# Patient Record
Sex: FEMALE | Race: BLACK OR AFRICAN AMERICAN | NOT HISPANIC OR LATINO | Employment: FULL TIME | URBAN - METROPOLITAN AREA
[De-identification: names, ages, dates, MRNs, and addresses within clinical notes are randomized per-mention and may not be internally consistent; named-entity substitution may affect disease eponyms.]

---
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
## 2020-04-08 PROBLEM — K58.1 IRRITABLE BOWEL SYNDROME WITH CONSTIPATION: Status: ACTIVE | Noted: 2020-04-08

## 2020-06-26 PROBLEM — N20.0 RENAL LITHIASIS: Status: ACTIVE | Noted: 2020-06-26

## 2020-09-26 ENCOUNTER — HOSPITAL ENCOUNTER (EMERGENCY)
Facility: HOSPITAL | Age: 20
Discharge: HOME | End: 2020-09-26
Attending: EMERGENCY MEDICINE
Payer: MEDICAID

## 2020-09-26 VITALS
HEIGHT: 63 IN | BODY MASS INDEX: 30.65 KG/M2 | RESPIRATION RATE: 19 BRPM | WEIGHT: 173 LBS | TEMPERATURE: 99 F | DIASTOLIC BLOOD PRESSURE: 56 MMHG | HEART RATE: 92 BPM | OXYGEN SATURATION: 100 % | SYSTOLIC BLOOD PRESSURE: 117 MMHG

## 2020-09-26 DIAGNOSIS — K59.00 CONSTIPATION, UNSPECIFIED CONSTIPATION TYPE: ICD-10-CM

## 2020-09-26 DIAGNOSIS — R10.84 GENERALIZED ABDOMINAL PAIN: Primary | ICD-10-CM

## 2020-09-26 LAB
ALBUMIN SERPL BCP-MCNC: 3.7 G/DL (ref 3.5–5.2)
ALP SERPL-CCNC: 41 U/L (ref 55–135)
ALT SERPL W/O P-5'-P-CCNC: 11 U/L (ref 10–44)
ANION GAP SERPL CALC-SCNC: 8 MMOL/L (ref 8–16)
AST SERPL-CCNC: 14 U/L (ref 10–40)
B-HCG UR QL: NEGATIVE
BASOPHILS # BLD AUTO: 0.02 K/UL (ref 0–0.2)
BASOPHILS NFR BLD: 0.4 % (ref 0–1.9)
BILIRUB SERPL-MCNC: 0.2 MG/DL (ref 0.1–1)
BILIRUB UR QL STRIP: NEGATIVE
BUN SERPL-MCNC: 8 MG/DL (ref 6–20)
CALCIUM SERPL-MCNC: 9.2 MG/DL (ref 8.7–10.5)
CHLORIDE SERPL-SCNC: 105 MMOL/L (ref 95–110)
CLARITY UR: CLEAR
CO2 SERPL-SCNC: 25 MMOL/L (ref 23–29)
COLOR UR: YELLOW
CREAT SERPL-MCNC: 0.7 MG/DL (ref 0.5–1.4)
CTP QC/QA: YES
DIFFERENTIAL METHOD: ABNORMAL
EOSINOPHIL # BLD AUTO: 0.1 K/UL (ref 0–0.5)
EOSINOPHIL NFR BLD: 1.8 % (ref 0–8)
ERYTHROCYTE [DISTWIDTH] IN BLOOD BY AUTOMATED COUNT: 13.4 % (ref 11.5–14.5)
EST. GFR  (AFRICAN AMERICAN): >60 ML/MIN/1.73 M^2
EST. GFR  (NON AFRICAN AMERICAN): >60 ML/MIN/1.73 M^2
GLUCOSE SERPL-MCNC: 112 MG/DL (ref 70–110)
GLUCOSE UR QL STRIP: NEGATIVE
HCT VFR BLD AUTO: 39.5 % (ref 37–48.5)
HGB BLD-MCNC: 12.5 G/DL (ref 12–16)
HGB UR QL STRIP: ABNORMAL
IMM GRANULOCYTES # BLD AUTO: 0.01 K/UL (ref 0–0.04)
IMM GRANULOCYTES NFR BLD AUTO: 0.2 % (ref 0–0.5)
KETONES UR QL STRIP: NEGATIVE
LEUKOCYTE ESTERASE UR QL STRIP: NEGATIVE
LYMPHOCYTES # BLD AUTO: 2.6 K/UL (ref 1–4.8)
LYMPHOCYTES NFR BLD: 48.6 % (ref 18–48)
MCH RBC QN AUTO: 26.9 PG (ref 27–31)
MCHC RBC AUTO-ENTMCNC: 31.6 G/DL (ref 32–36)
MCV RBC AUTO: 85 FL (ref 82–98)
MICROSCOPIC COMMENT: NORMAL
MONOCYTES # BLD AUTO: 0.4 K/UL (ref 0.3–1)
MONOCYTES NFR BLD: 7.2 % (ref 4–15)
NEUTROPHILS # BLD AUTO: 2.3 K/UL (ref 1.8–7.7)
NEUTROPHILS NFR BLD: 41.8 % (ref 38–73)
NITRITE UR QL STRIP: NEGATIVE
NRBC BLD-RTO: 0 /100 WBC
PH UR STRIP: 6 [PH] (ref 5–8)
PLATELET # BLD AUTO: 329 K/UL (ref 150–350)
PMV BLD AUTO: 9.4 FL (ref 9.2–12.9)
POTASSIUM SERPL-SCNC: 3.7 MMOL/L (ref 3.5–5.1)
PROT SERPL-MCNC: 7.5 G/DL (ref 6–8.4)
PROT UR QL STRIP: NEGATIVE
RBC # BLD AUTO: 4.64 M/UL (ref 4–5.4)
RBC #/AREA URNS HPF: 3 /HPF (ref 0–4)
SODIUM SERPL-SCNC: 138 MMOL/L (ref 136–145)
SP GR UR STRIP: 1.02 (ref 1–1.03)
URN SPEC COLLECT METH UR: ABNORMAL
UROBILINOGEN UR STRIP-ACNC: NEGATIVE EU/DL
WBC # BLD AUTO: 5.41 K/UL (ref 3.9–12.7)
WBC #/AREA URNS HPF: 2 /HPF (ref 0–5)

## 2020-09-26 PROCEDURE — 99284 EMERGENCY DEPT VISIT MOD MDM: CPT | Mod: 25

## 2020-09-26 PROCEDURE — 81025 URINE PREGNANCY TEST: CPT | Performed by: EMERGENCY MEDICINE

## 2020-09-26 PROCEDURE — 85025 COMPLETE CBC W/AUTO DIFF WBC: CPT

## 2020-09-26 PROCEDURE — 81000 URINALYSIS NONAUTO W/SCOPE: CPT

## 2020-09-26 PROCEDURE — 80053 COMPREHEN METABOLIC PANEL: CPT

## 2020-09-26 PROCEDURE — 36000 PLACE NEEDLE IN VEIN: CPT

## 2020-09-26 RX ORDER — DICYCLOMINE HYDROCHLORIDE 10 MG/ML
20 INJECTION INTRAMUSCULAR
Status: DISCONTINUED | OUTPATIENT
Start: 2020-09-26 | End: 2020-09-26 | Stop reason: HOSPADM

## 2020-09-26 RX ORDER — DICYCLOMINE HYDROCHLORIDE 20 MG/1
20 TABLET ORAL 2 TIMES DAILY
Qty: 30 TABLET | Refills: 0 | Status: SHIPPED | OUTPATIENT
Start: 2020-09-26 | End: 2020-10-11

## 2020-09-26 NOTE — ED TRIAGE NOTES
Pt complaina of lower abd pain, x 2-3 days denies n/v/d tolerating po food and fluids, states has hx of kidney stones

## 2020-09-27 NOTE — ED PROVIDER NOTES
Encounter Date: 9/26/2020       History     Chief Complaint   Patient presents with    Abdominal Pain     Pt reports generalized abdominal pain and diarrhea that started yesterday.      Patient is a 19 y/o female w/ PMH of IBS who presents to ED with c/o generalized abdominal pain x 2 days. Patient describes the pains as sharp and intermittent. Denies any exacerbating or alleviating factors. Denies taking anything for the pain. She reports 1 episode of soft stool this morning. Denies any nausea, vomiting, dysuria, hematuria, decreased urine output, fever, chills, or any other complaints.         Review of patient's allergies indicates:  No Known Allergies  Past Medical History:   Diagnosis Date    Asthma     Helicobacter pylori antibody positive     IBS (irritable bowel syndrome)     STD (sexually transmitted disease)     VUR (vesicoureteric reflux)      Past Surgical History:   Procedure Laterality Date    MYRINGOTOMY W/ TUBES Bilateral      Family History   Problem Relation Age of Onset    No Known Problems Mother     Hypertension Father     Asthma Father      Social History     Tobacco Use    Smoking status: Never Smoker    Smokeless tobacco: Never Used   Substance Use Topics    Alcohol use: No     Alcohol/week: 0.0 standard drinks    Drug use: No     Review of Systems   Constitutional: Negative for chills, diaphoresis and fever.   Respiratory: Negative for shortness of breath.    Cardiovascular: Negative for chest pain.   Gastrointestinal: Positive for abdominal pain and diarrhea. Negative for abdominal distention, blood in stool, constipation, nausea and vomiting.   Genitourinary: Negative for decreased urine volume, difficulty urinating, dysuria, flank pain, frequency, hematuria, urgency, vaginal bleeding and vaginal discharge.   Musculoskeletal: Negative for back pain.   Skin: Negative for rash.   Neurological: Negative for weakness.       Physical Exam     Initial Vitals [09/26/20 1304]   BP  Pulse Resp Temp SpO2   (!) 138/90 97 19 99.1 °F (37.3 °C) 99 %      MAP       --         Physical Exam    Nursing note and vitals reviewed.  Constitutional: She appears well-developed and well-nourished. She is not diaphoretic. No distress.   Sitting upright in bed, smiling and playing on cellular device   HENT:   Head: Normocephalic and atraumatic.   Eyes: Conjunctivae and EOM are normal. Pupils are equal, round, and reactive to light.   Neck: Normal range of motion. Neck supple.   Cardiovascular: Normal rate, regular rhythm, normal heart sounds and intact distal pulses.   Pulmonary/Chest: Breath sounds normal. No respiratory distress.   Abdominal: Soft. Bowel sounds are normal. There is no abdominal tenderness.   Abdomen is soft, no focal abdominal tenderness. No rebound or guarding.    Musculoskeletal: Normal range of motion. No tenderness or edema.   Neurological: She is alert and oriented to person, place, and time. She has normal strength. GCS score is 15. GCS eye subscore is 4. GCS verbal subscore is 5. GCS motor subscore is 6.   Skin: Skin is warm. Capillary refill takes less than 2 seconds. No rash noted.         ED Course   Procedures  Labs Reviewed   CBC W/ AUTO DIFFERENTIAL - Abnormal; Notable for the following components:       Result Value    Mean Corpuscular Hemoglobin 26.9 (*)     Mean Corpuscular Hemoglobin Conc 31.6 (*)     Lymph% 48.6 (*)     All other components within normal limits   COMPREHENSIVE METABOLIC PANEL - Abnormal; Notable for the following components:    Glucose 112 (*)     Alkaline Phosphatase 41 (*)     All other components within normal limits   URINALYSIS, REFLEX TO URINE CULTURE - Abnormal; Notable for the following components:    Occult Blood UA 1+ (*)     All other components within normal limits    Narrative:     Specimen Source->Urine   URINALYSIS MICROSCOPIC    Narrative:     Specimen Source->Urine   POCT URINE PREGNANCY          Imaging Results          X-Ray Abdomen Flat  And Erect (Final result)  Result time 09/26/20 16:03:57    Final result by James Glaser MD (09/26/20 16:03:57)                 Impression:      Mild colonic stool burden which may represent constipation, without evidence of obstruction.      Electronically signed by: James Glaser MD  Date:    09/26/2020  Time:    16:03             Narrative:    EXAMINATION:  XR ABDOMEN FLAT AND ERECT    CLINICAL HISTORY:  Generalized abdominal pain    TECHNIQUE:  Flat and erect AP views of the abdomen were performed.    COMPARISON:  CT renal stone study 06/23/2020 and abdominal series 01/09/2017; abdominal ultrasound 07/25/2018    FINDINGS:  Mild amount of scattered fecal material throughout the colon.  Otherwise, no dilated loops of bowel or small bowel air-fluid levels to suggest obstruction.  No organomegaly or significant mass effect.  No large amount of free air or abnormal intra-abdominal calcification seen in this patient with known right kidney 2 mm nephrolith.  Imaged lung bases are clear.  Osseous structures are intact.                                 Medical Decision Making:   Initial Assessment:   Patient is a 19 y/o female who presents to ED with c/o generalized abdominal pain x 2 days. VSS, non-toxic appearing, patient playing on cellular device on initial exam. Benign abdominal examination - no tenderness, abdomen is soft with normal bowel sound. No rebound or guarding.   ED Management:  CBC is without leukocytosis or bandemia.   Electrolytes within normal limits. Normal renal function and normal LFTs.   Lipase normal.   UA w/o evidence of acute cystitis. UPT negative.   X-ray shows mild colonic stool burden which may represent constipation. No free air or concern for obstruction.   Patient is well-appearing and hemodynamically stable. Repeat abdominal examination w/o any tenderness. Patient will be discharged to home. Given rx to take as needed for the intermittent pains. Patient having bowel movements, not  regular. Discussed high fiber diet and increasing fluid intake. Also discussed with her to use MIralax prn OTC for soft daily bowel movements. Advised her to f/u with PCP in 1-2 days for re-evaluation. ED precautions discussed to return for any worsening of symptoms. Patient voiced understanding and agreement with plan of care.                    ED Course as of Sep 27 0425   Sat Sep 26, 2020   1700 I discussed the patient's care with Advanced Practice Clinician. I did not perform a face to face evaluation. I reviewed their note and agree with the history, physical, assessment, diagnosis, treatment, and discharge plan.    [NP]      ED Course User Index  [NP] Stalin García MD            Clinical Impression:       ICD-10-CM ICD-9-CM   1. Generalized abdominal pain  R10.84 789.07   2. Constipation, unspecified constipation type  K59.00 564.00                      Disposition:   Disposition: Discharged  Condition: Stable     ED Disposition Condition    Discharge Stable        ED Prescriptions     Medication Sig Dispense Start Date End Date Auth. Provider    dicyclomine (BENTYL) 20 mg tablet Take 1 tablet (20 mg total) by mouth 2 (two) times daily. for 15 days 30 tablet 9/26/2020 10/11/2020 Mona Crawley PA-C        Follow-up Information     Follow up With Specialties Details Why Contact Info    [facility] Schedule an appointment as soon as possible for a visit in 2 days For recheck of symptoms you were seen for today [address]  [place] [address]  [phone]      [facility] Go to  If symptoms worsen [address] [facility] [id number] [phone]                                       Mona Crawley PA-C  09/27/20 2158

## 2020-09-30 ENCOUNTER — HOSPITAL ENCOUNTER (EMERGENCY)
Facility: HOSPITAL | Age: 20
Discharge: HOME | End: 2020-09-30
Attending: PEDIATRICS
Payer: MEDICAID

## 2020-09-30 ENCOUNTER — OFFICE VISIT (OUTPATIENT)
Dept: URGENT CARE | Facility: CLINIC | Age: 20
End: 2020-09-30
Payer: MEDICAID

## 2020-09-30 VITALS
RESPIRATION RATE: 18 BRPM | OXYGEN SATURATION: 99 % | WEIGHT: 173 LBS | TEMPERATURE: 99 F | HEART RATE: 78 BPM | HEIGHT: 63 IN | DIASTOLIC BLOOD PRESSURE: 76 MMHG | SYSTOLIC BLOOD PRESSURE: 118 MMHG | BODY MASS INDEX: 30.65 KG/M2

## 2020-09-30 DIAGNOSIS — R10.30 LOWER ABDOMINAL PAIN: ICD-10-CM

## 2020-09-30 DIAGNOSIS — K59.00 CONSTIPATION, UNSPECIFIED CONSTIPATION TYPE: Primary | ICD-10-CM

## 2020-09-30 LAB
B-HCG UR QL: NEGATIVE
BACTERIA #/AREA URNS AUTO: NORMAL /HPF
BILIRUB UR QL STRIP: NEGATIVE
CLARITY UR REFRACT.AUTO: CLEAR
COLOR UR AUTO: YELLOW
CTP QC/QA: YES
GLUCOSE UR QL STRIP: NEGATIVE
HGB UR QL STRIP: ABNORMAL
KETONES UR QL STRIP: NEGATIVE
LEUKOCYTE ESTERASE UR QL STRIP: NEGATIVE
MICROSCOPIC COMMENT: NORMAL
NITRITE UR QL STRIP: NEGATIVE
PH UR STRIP: 5 [PH] (ref 5–8)
PROT UR QL STRIP: NEGATIVE
RBC #/AREA URNS AUTO: 1 /HPF (ref 0–4)
SP GR UR STRIP: 1.01 (ref 1–1.03)
SQUAMOUS #/AREA URNS AUTO: 1 /HPF
URN SPEC COLLECT METH UR: ABNORMAL
WBC #/AREA URNS AUTO: 2 /HPF (ref 0–5)

## 2020-09-30 PROCEDURE — 99283 EMERGENCY DEPT VISIT LOW MDM: CPT

## 2020-09-30 PROCEDURE — 99284 PR EMERGENCY DEPT VISIT,LEVEL IV: ICD-10-PCS | Mod: ,,, | Performed by: PEDIATRICS

## 2020-09-30 PROCEDURE — 81001 URINALYSIS AUTO W/SCOPE: CPT

## 2020-09-30 PROCEDURE — 99284 EMERGENCY DEPT VISIT MOD MDM: CPT | Mod: ,,, | Performed by: PEDIATRICS

## 2020-09-30 PROCEDURE — 81025 URINE PREGNANCY TEST: CPT | Performed by: PEDIATRICS

## 2020-09-30 PROCEDURE — 99282 EMERGENCY DEPT VISIT SF MDM: CPT

## 2020-09-30 RX ORDER — POLYETHYLENE GLYCOL 3350 17 G/17G
17 POWDER, FOR SOLUTION ORAL 2 TIMES DAILY
Qty: 1020 G | Refills: 0 | Status: SHIPPED | OUTPATIENT
Start: 2020-09-30 | End: 2020-10-30

## 2020-09-30 NOTE — DISCHARGE INSTRUCTIONS
1. Start Miralax once capful mixed in 4-6oz of fluid twice daily for one week.  2. Increase miralax to 3x a day if still not stooling soft stools once or twice daily. If too loose or too frequent go down on the dose (to once a day or 1/2 cap once a day or 1/2 cap once every other day). Do not take a step up the scale or down the scale more often than every 2 days.  2. Eat and drink as nomal  3. If pain, use ibuprofen (generic advil/motrin) or acetaminophen (generic tylenol)  4. Give time to poop after breakfast and after lunch 10-15 minutes. If no poop x 2 days, see step 1.  5. Don't stop miralax just decrease the amount.  6. If after a month or two, you want to try stopping, try it, but don't be afraid to restart it.

## 2020-09-30 NOTE — ED PROVIDER NOTES
Encounter Date: 9/30/2020       History     Chief Complaint   Patient presents with    Abdominal Pain     back, leg pain since sat,      HPI     21 y/o presenting w/ a PMHx of IBS and prior h/o bilateral nephrolithiasis  presenting for constant  abdominal and lumbar back pain. Onset of her pain dates back to Friday night. Pain rated as 8/10 in severity and achy in nature. It is aggravated by sitting up and alleviated by laying down. Patient has attempted Tylenol and Bentyl (20mg) w/o any relief. The latter medication was prescribed this past Saturday in the ED at [facility] in [place]. Last intake of tylenol was last night at 11pm. Patient denies any dysuria, vaginal discharge/bleeding or known hx of STIs. She endorses frequency 2/2 inc po intake. BM occurs x1 daily. Denies hard pellet-like stools. However, she recently the ED visited and was diagnosed with constipation. KUB demonstrated mild colonic stool burden. She has attempted a laxative, ex-lax. No fevers, cough, emesis, or diarrhea, flatulence, or abdominal cramping. Her appetite has remained the same. She admits to consuming fast food. Denies post-prandial pain and intake of spicy foods.     PMHx: kidney stones (dx in June 2020), IBS  Meds: None  Immunizations: UTD.  Social hx: Sexually active w/ men. Use condom al lthe time. Last sexual encounter months ago. Last PMS last month.        Review of patient's allergies indicates:  No Known Allergies  Past Medical History:   Diagnosis Date    Asthma     Helicobacter pylori antibody positive     IBS (irritable bowel syndrome)     STD (sexually transmitted disease)     VUR (vesicoureteric reflux)      Past Surgical History:   Procedure Laterality Date    MYRINGOTOMY W/ TUBES Bilateral      Family History   Problem Relation Age of Onset    No Known Problems Mother     Hypertension Father     Asthma Father      Social History     Tobacco Use    Smoking status: Never Smoker    Smokeless tobacco: Never Used    Substance Use Topics    Alcohol use: No     Alcohol/week: 0.0 standard drinks    Drug use: No     Review of Systems   Constitutional: Negative for appetite change, fatigue and fever.   HENT: Negative for congestion, rhinorrhea, sneezing, sore throat and trouble swallowing.    Eyes: Negative for redness.   Respiratory: Negative for cough.    Cardiovascular: Negative for chest pain.   Gastrointestinal: Positive for abdominal pain. Negative for abdominal distention, diarrhea, nausea and vomiting.   Genitourinary: Positive for frequency. Negative for dysuria, urgency, vaginal bleeding and vaginal discharge.   Musculoskeletal: Positive for back pain.       Physical Exam     Initial Vitals [09/30/20 1224]   BP Pulse Resp Temp SpO2   118/76 78 18 99.2 °F (37.3 °C) 99 %      MAP       --         Physical Exam    Nursing note and vitals reviewed.  Constitutional: She appears well-developed and well-nourished. No distress.   Well-appearing female in NAD   HENT:   Head: Normocephalic and atraumatic.   Mouth/Throat: Oropharynx is clear and moist.   Eyes: Conjunctivae are normal. Right eye exhibits no discharge. Left eye exhibits no discharge.   Neck: Neck supple. No thyromegaly present.   Cardiovascular: Normal rate and regular rhythm. Exam reveals no gallop and no friction rub.    No murmur heard.  Pulmonary/Chest: Breath sounds normal. She has no wheezes. She has no rhonchi. She has no rales.   Abdominal: Soft. She exhibits no distension. There is abdominal tenderness (mild tenderness to lower quadrants and LUQ; on rpt exam difficult to reproduce tenderness in lower quadrants). There is no rebound.   No Leiva's sign, McBurney or Rovsing's sign   Neurological: She is alert and oriented to person, place, and time.   Skin: Skin is warm. Capillary refill takes less than 2 seconds.         ED Course   Procedures  Labs Reviewed   URINALYSIS, REFLEX TO URINE CULTURE - Abnormal; Notable for the following components:       Result  Value    Occult Blood UA 1+ (*)     All other components within normal limits    Narrative:     Specimen Source->Urine   URINALYSIS MICROSCOPIC    Narrative:     Specimen Source->Urine   POCT URINE PREGNANCY     EKG Readings: (Independently Interpreted)           Imaging Results    None          Medical Decision Making:   Initial Assessment:   21 y/o well appearing female with a PMHx of IBS and nephrolithiasis presenting for abdominal pain. KUB from a few days ago with significant stool burden only treated with 1 laxative with minimal output. Colicky nature to pain likely most c/w constipation.   Differential Diagnosis:   Constipation/IBS vs gastritis vs UTI vs kidney stones vs doubt obstruction   Clinical Tests:   Lab Tests: Ordered and Reviewed  The following lab test(s) were unremarkable: Urinalysis  ED Management:  Urinalysis unremarkable w/ no signs of hematuria effectively ruling out a kidney stone or LE, nitrites or WBC r/o UTI  Discharged on Miralax s/ specific instructions on when to step up or scale down the scale d/t stool burden previously identified on OSH xray and persistence of sxs with laxative x1  Advise against stopping Miralax prior to two months of use   Informed to use Tylenol or Motrin as needed for pain  Instructed to f/u w/ PCP if persistence of sxs               Attending Attestation:   Physician Attestation Statement for Resident:  As the supervising MD   Physician Attestation Statement: I have personally seen and examined this patient.   I agree with the above history. -:   As the supervising MD I agree with the above PE.    As the supervising MD I agree with the above treatment, course, plan, and disposition.                              Clinical Impression:     ICD-10-CM ICD-9-CM   1. Constipation, unspecified constipation type  K59.00 564.00   2. Lower abdominal pain  R10.30 789.09                          ED Disposition Condition    Discharge Stable        ED Prescriptions      Medication Sig Dispense Start Date End Date Auth. Provider    polyethylene glycol (GLYCOLAX) 17 gram/dose powder Take 17 g by mouth 2 (two) times daily. 1,020 g 9/30/2020 10/30/2020 Edilma Garces DO        Follow-up Information     Follow up With Specialties Details Why Contact Info    [facility] In 1 day If symptoms worsen [address] [facility] [address]  [phone]                                         Edilma Garces DO  09/30/20 1529

## 2020-09-30 NOTE — ED TRIAGE NOTES
Pt arrived to ED alone with lower abdominal pain, back pain, and side pain.  Pt has been dx with kidney stones in past.  Seen at [facility] on Saturday and given bentyl.  Pt states she was dx with constipation, but has been having normal bowel movements with continued pain.  Denies painful urination. Pt seen at urgent care and sent here for evaluation.          LOC awake and alert, cooperative, calm affect, recognizes caregiver, responds appropriately for age  APPEARANCE resting comfortably in no acute distress. Pt has clean skin, nails, and clothes.   HEENT Head appears normal in size and shape,  Eyes appear normal w/o drainage, Ears appear normal w/o drainage, nose appears normal w/o drainage/mucus, Throat and neck appear normal w/o drainage/redness  NEURO eyes open spontaneously, responses appropriate, pupils equal in size,  RESPIRATORY airway open and patent, respirations of regular rate and rhythm, nonlabored, no respiratory distress observed  MUSCULOSKELETAL moves all extremities well, no obvious deformities, endorses back pain  SKIN normal color for ethnicity, warm, dry, with normal turgor, moist mucous membranes, no bruising or breakdown observed  ABDOMEN soft, lower abdominal tenderness, non distended, no guarding, regular bowel movements,   GENITOURINARY voiding well, no difficulty starting a stream, denies pain, burning, itching, hx kidney stones,

## 2020-10-13 ENCOUNTER — HOSPITAL ENCOUNTER (EMERGENCY)
Facility: HOSPITAL | Age: 20
Discharge: HOME | End: 2020-10-13
Attending: PEDIATRICS
Payer: MEDICAID

## 2020-10-13 VITALS
DIASTOLIC BLOOD PRESSURE: 70 MMHG | TEMPERATURE: 98 F | OXYGEN SATURATION: 99 % | SYSTOLIC BLOOD PRESSURE: 123 MMHG | RESPIRATION RATE: 18 BRPM | HEART RATE: 104 BPM | HEIGHT: 63 IN | BODY MASS INDEX: 31.01 KG/M2 | WEIGHT: 175 LBS

## 2020-10-13 DIAGNOSIS — B34.9 VIRAL SYNDROME: ICD-10-CM

## 2020-10-13 DIAGNOSIS — R05.9 COUGH: Primary | ICD-10-CM

## 2020-10-13 DIAGNOSIS — R07.9 CHEST PAIN: ICD-10-CM

## 2020-10-13 LAB
B-HCG UR QL: NEGATIVE
CTP QC/QA: YES
S PYO RRNA THROAT QL PROBE: NEGATIVE
SARS-COV-2 RDRP RESP QL NAA+PROBE: NEGATIVE

## 2020-10-13 PROCEDURE — 99284 EMERGENCY DEPT VISIT MOD MDM: CPT | Mod: ,,, | Performed by: PEDIATRICS

## 2020-10-13 PROCEDURE — 93010 EKG 12-LEAD: ICD-10-PCS | Mod: ,,, | Performed by: PEDIATRICS

## 2020-10-13 PROCEDURE — 81025 URINE PREGNANCY TEST: CPT | Performed by: PEDIATRICS

## 2020-10-13 PROCEDURE — 87880 STREP A ASSAY W/OPTIC: CPT

## 2020-10-13 PROCEDURE — 99284 PR EMERGENCY DEPT VISIT,LEVEL IV: ICD-10-PCS | Mod: ,,, | Performed by: PEDIATRICS

## 2020-10-13 PROCEDURE — 93005 ELECTROCARDIOGRAM TRACING: CPT

## 2020-10-13 PROCEDURE — 87081 CULTURE SCREEN ONLY: CPT

## 2020-10-13 PROCEDURE — U0002 COVID-19 LAB TEST NON-CDC: HCPCS | Performed by: PEDIATRICS

## 2020-10-13 PROCEDURE — 93010 ELECTROCARDIOGRAM REPORT: CPT | Mod: ,,, | Performed by: PEDIATRICS

## 2020-10-13 PROCEDURE — 99284 EMERGENCY DEPT VISIT MOD MDM: CPT | Mod: 25

## 2020-10-13 NOTE — ED TRIAGE NOTES
"Pt arrived to ED alone with c/o chest pain with coughing, chest congestion, headache, and fever.  Pt states it started yesterday in the afternoon.  She took tylenol last night.  +covid exposure.  States she coughed up "cold and it had blood in it."  Pt denies vomiting/diarrhea.  Pt also states she had a flu shot on Friday.        LOC awake and alert, cooperative, calm affect, recognizes caregiver, responds appropriately for age  APPEARANCE resting comfortably in no acute distress. Pt has clean skin, nails, and clothes.   HEENT Head appears normal in size and shape, endorses headache,  Eyes appear normal w/o drainage, Ears appear normal w/o drainage, nose appears normal w/o drainage/mucus, Throat and neck appear normal w/o drainage/redness  NEURO eyes open spontaneously, responses appropriate, pupils equal in size,  RESPIRATORY airway open and patent, respirations of regular rate and rhythm, nonlabored, no respiratory distress observed, endorses chest pain worsening with inhalation and exhalation,   MUSCULOSKELETAL moves all extremities well, no obvious deformities  SKIN normal color for ethnicity, warm, dry, with normal turgor, moist mucous membranes, no bruising or breakdown observed  ABDOMEN soft, non tender, non distended, no guarding, regular bowel movements  GENITOURINARY voiding well, no difficulty starting a stream, denies pain, burning, itching    "

## 2020-10-13 NOTE — DISCHARGE INSTRUCTIONS
It was a pleasure taking care of you today!     You may take motrin (600mg every 6 hours) and tylenol (650mg every 6 hours) for pain or if you develop a fever.     Please return to the ER if you experience additional episodes of coughing up blood that are persistent and increasing in volume. If you have repeated episodes of coughing blood, you may need to undergo additional workup.     Please follow up with your primary care physician in the next few days if your symptoms are not resolving.

## 2020-10-13 NOTE — ED NOTES
Covid test handed off to [facility], to run.  States he will document the result.

## 2020-10-13 NOTE — ED PROVIDER NOTES
Encounter Date: 10/13/2020       History     Chief Complaint   Patient presents with    Cough     chest pain with coughing; coughed up some blood, fever/chills last night; congestion    Fever     Ms. Garcia is a 20 y.o. female with PMH of asthma who presents with 4-day hx of cough and headache. The cough is new, acute-onset, constant, productive of white sputum and blood x1 episode this am w/o preceeding red drink/food that she's awake of, no aggravating or relieving factors, and associated with headache, chest pain, congestion, sore throat, loss of taste, and fever to 100.8 x1 yesterday evening, which has not recurred. Pt took tylenol last night at 5pm. This morning she woke up and coughed up a quarter-size mucus with blood in it, which is why she came to the hospital. She denies anosmia, hx of homelessness, incarceration, contact with persons incarcerated, weight loss, night sweats, nausea, vomiting, diarrhea, constipation, and sob. Reports one sick contact 4 days ago, who had headache and congestion.   Pt denies vaping.        Review of patient's allergies indicates:  No Known Allergies  Past Medical History:   Diagnosis Date    Asthma     Helicobacter pylori antibody positive     History of kidney stones     IBS (irritable bowel syndrome)     STD (sexually transmitted disease)     VUR (vesicoureteric reflux)      Past Surgical History:   Procedure Laterality Date    MYRINGOTOMY W/ TUBES Bilateral      Family History   Problem Relation Age of Onset    No Known Problems Mother     Hypertension Father     Asthma Father      Social History     Tobacco Use    Smoking status: Never Smoker    Smokeless tobacco: Never Used   Substance Use Topics    Alcohol use: No     Alcohol/week: 0.0 standard drinks     Frequency: Never     Binge frequency: Never    Drug use: No     Review of Systems   Constitutional: Positive for appetite change and fever. Negative for chills.   HENT: Positive for congestion,  rhinorrhea, sinus pain and sore throat. Negative for ear pain.    Eyes: Negative for pain and visual disturbance.   Respiratory: Positive for cough. Negative for shortness of breath and wheezing.    Cardiovascular: Positive for chest pain. Negative for palpitations and leg swelling.   Gastrointestinal: Negative for abdominal pain, constipation, diarrhea, nausea and vomiting.   Endocrine: Negative for polydipsia and polyuria.   Genitourinary: Negative for decreased urine volume, dysuria, hematuria, vaginal bleeding and vaginal pain.   Musculoskeletal: Negative for arthralgias and back pain.   Skin: Negative for color change and rash.   Neurological: Positive for headaches. Negative for dizziness, weakness and light-headedness.       Physical Exam     Initial Vitals [10/13/20 0806]   BP Pulse Resp Temp SpO2   123/70 104 18 98.4 °F (36.9 °C) 99 %      MAP       --         Physical Exam    Nursing note and vitals reviewed.  Constitutional: She appears well-developed and well-nourished. She is not diaphoretic. No distress.   Young woman sitting comfortably in bed, no acute distress, appears non-toxic   HENT:   Head: Normocephalic and atraumatic.   Right Ear: External ear normal.   Left Ear: External ear normal.   Nose: Nose normal.   Mouth/Throat: No oropharyngeal exudate.   Palatal petechia and redness   Eyes: Conjunctivae and EOM are normal. Pupils are equal, round, and reactive to light.   Neck: Normal range of motion. Neck supple.   Cardiovascular: Normal rate, regular rhythm, normal heart sounds and intact distal pulses.   No murmur heard.  Pulmonary/Chest: Breath sounds normal. No respiratory distress. She has no wheezes. She has no rales.   Abdominal: Soft. She exhibits no distension. There is no abdominal tenderness.   Musculoskeletal: Normal range of motion. No tenderness or edema.   Neurological: She is alert and oriented to person, place, and time.   Skin: Skin is warm and dry. Capillary refill takes less than  2 seconds. No pallor.         ED Course   Procedures  Labs Reviewed   CULTURE, STREP A,  THROAT   POCT URINE PREGNANCY   SARS-COV-2 RDRP GENE   POCT RAPID STREP A        ECG Results          EKG 12-lead (In process)  Result time 10/13/20 09:22:48    In process by Interface, Lab In [facility] (10/13/20 09:22:48)                 Narrative:    Test Reason : R07.9,    Vent. Rate : 085 BPM     Atrial Rate : 085 BPM     P-R Int : 122 ms          QRS Dur : 068 ms      QT Int : 342 ms       P-R-T Axes : 061 068 060 degrees     QTc Int : 406 ms    Age and gender specific analysis  Normal sinus rhythm  Normal ECG  When compared with ECG of 29-MAR-2020 13:10,  No significant change was found    Referred By: [email or web address]   SELF           Confirmed By:                             Imaging Results          X-Ray Chest PA And Lateral (Final result)  Result time 10/13/20 09:59:30    Final result by Keyla Najera MD (10/13/20 09:59:30)                 Impression:      No convincing evidence of disease.  No source for cough identified.      Electronically signed by: Keyla Najera MD  Date:    10/13/2020  Time:    09:59             Narrative:    EXAMINATION:  XR CHEST PA AND LATERAL    CLINICAL HISTORY:  Cough    TECHNIQUE:  PA and lateral views of the chest were performed.    COMPARISON:  12/24/2019.    FINDINGS:  Mediastinal structures are midline. Cardiac silhouette and pulmonary vascular distribution are normal.    Lung volumes are normal and symmetric. I detect no pulmonary disease, pleural fluid, lymph node enlargement, cardiac decompensation, pneumothorax, pneumomediastinum, pneumoperitoneum or significant osseous abnormality.                                 Medical Decision Making:   Initial Assessment:   20 y.o. with hx of asthma presents with 4-day hx of cough, congestion, sore throat, headache, chest pain, and hemoptysis x1, vitals stable at this time and patient appears well and clinically stable, PE remarkable for  pharyngeal petechiae  Differential Diagnosis:   Covid, strep infection, pharyngitis, viral illness, sinusitis, less likely pneumonia per reassuring VS and lung exam  Clinical Tests:   Lab Tests: Ordered and Reviewed  The following lab test(s) were unremarkable: UPT  Radiological Study: Ordered and Reviewed  Medical Tests: Ordered and Reviewed  ED Management:  Patient given tylenol for headache and chest pain. Patient's workup included CXR, EKG, Covid, and Strep, which were all negative. I suspect the patient has an acute URI vs viral illness, which does not require any emergent intervention or additional workup. Though I do not have a for-certain explanation for her hemoptysis, she only had one episode and it was a small amount, so I do not think there is an acute process requiring intervention or workup. She has been counseled to return to the ED if she experiences additional episodes of hemoptysis, which she understands. We will contact patient if her UA is positive. Patient will be discharged at this time; she agrees with and is comfortable with the plan.               Attending Attestation:   Physician Attestation Statement for Resident:  As the supervising MD   Physician Attestation Statement: I have personally seen and examined this patient.   I agree with the above history. -:   As the supervising MD I agree with the above PE.    As the supervising MD I agree with the above treatment, course, plan, and disposition.  I have reviewed and agree with the residents interpretation of the following: x-rays and EKG.                              Clinical Impression:     ICD-10-CM ICD-9-CM   1. Cough  R05 786.2   2. Chest pain  R07.9 786.50   3. Viral syndrome  B34.9 079.99                          ED Disposition Condition    Discharge Stable        ED Prescriptions     None        Follow-up Information     Follow up With Specialties Details Why Contact Info    [facility] Go to  If symptoms  worsen and to follow up about your recent ER visit [address]  [place] [address]  [phone]      [facility] Go to  if you have repeat episodes [address]  [facility] [id number] [phone]                                       Bethel Hall MD  Resident  10/13/20 1041       Edilma Garces,   10/15/20 0355

## 2020-10-13 NOTE — ED NOTES
Pt states she still cannot urinate.  Discussed plan of care with pt and need for urine.  Pt verbalized understanding.  Water given to encourage voiding.

## 2020-10-13 NOTE — MEDICAL/APP STUDENT
History     Chief Complaint   Patient presents with    Cough     chest pain with coughing; coughed up some blood, fever/chills last night; congestion    Fever     21 yo F presented to ED today with complaints of 5 day history of chest pain and headache, with a Tmax of 100.8 on 10/12/2020 evening. Patient states that Friday was onset of cold sx, with onset of throat pain Monday morning. Monday evening at 6pm, patient took temperature 3x, with one temp at 100.8 and the other temps wnl. This morning, patient states she woke up coughing and gagging appx 1 quarter size amt of blood 1x. Patient denies hx of homelessness, contact with incarcerated individuals. Endorses recent sick contacts.          Past Medical History:   Diagnosis Date    Asthma     Helicobacter pylori antibody positive     History of kidney stones     IBS (irritable bowel syndrome)     STD (sexually transmitted disease)     VUR (vesicoureteric reflux)        Past Surgical History:   Procedure Laterality Date    MYRINGOTOMY W/ TUBES Bilateral        Family History   Problem Relation Age of Onset    No Known Problems Mother     Hypertension Father     Asthma Father        Social History     Tobacco Use    Smoking status: Never Smoker    Smokeless tobacco: Never Used   Substance Use Topics    Alcohol use: No     Alcohol/week: 0.0 standard drinks     Frequency: Never     Binge frequency: Never    Drug use: No       Review of Systems   Constitutional: Positive for fatigue and fever. Negative for chills, diaphoresis and unexpected weight change.   HENT: Positive for sinus pressure and sinus pain.    Respiratory: Positive for cough and shortness of breath.    Cardiovascular: Negative for palpitations.   Gastrointestinal: Negative for constipation, diarrhea, nausea and vomiting.   Endocrine: Negative.    Genitourinary: Negative for difficulty urinating and hematuria.   Musculoskeletal: Negative for arthralgias.   Skin: Negative.   "  Allergic/Immunologic: Negative.    Neurological: Negative for dizziness, syncope and light-headedness.   Hematological: Negative.    Psychiatric/Behavioral: Negative.        Physical Exam   /70 (BP Location: Right arm, Patient Position: Sitting)   Pulse 104   Temp 98.4 °F (36.9 °C)   Resp 18   Ht 5' 3" (1.6 m)   Wt 79.4 kg (175 lb)   LMP 10/02/2020   SpO2 99%   BMI 31.00 kg/m²     Physical Exam    Constitutional: She appears well-developed and well-nourished.   HENT:   Head: Normocephalic and atraumatic.   Right Ear: External ear normal.   Left Ear: External ear normal.   Nose: Nose normal.   Eyes: Conjunctivae and EOM are normal.   Neck: Normal range of motion.   Cardiovascular: Normal rate and regular rhythm.   Pulmonary/Chest:   Breath sounds diminished bilaterally   Abdominal: Soft. She exhibits no distension.   Neurological: She is alert and oriented to person, place, and time.   Skin: Skin is warm and dry.   Psychiatric: She has a normal mood and affect.         ED Course         "

## 2020-10-15 LAB — BACTERIA THROAT CULT: NORMAL

## 2020-11-16 ENCOUNTER — HOSPITAL ENCOUNTER (EMERGENCY)
Facility: HOSPITAL | Age: 20
Discharge: HOME | End: 2020-11-17
Attending: EMERGENCY MEDICINE
Payer: MEDICAID

## 2020-11-16 DIAGNOSIS — M79.601 RIGHT ARM PAIN: Primary | ICD-10-CM

## 2020-11-16 LAB — POCT GLUCOSE: 109 MG/DL (ref 70–110)

## 2020-11-16 PROCEDURE — 82962 GLUCOSE BLOOD TEST: CPT

## 2020-11-16 PROCEDURE — 99284 EMERGENCY DEPT VISIT MOD MDM: CPT | Mod: 25

## 2020-11-17 VITALS
TEMPERATURE: 99 F | WEIGHT: 171 LBS | HEART RATE: 87 BPM | RESPIRATION RATE: 16 BRPM | HEIGHT: 63 IN | OXYGEN SATURATION: 100 % | SYSTOLIC BLOOD PRESSURE: 120 MMHG | BODY MASS INDEX: 30.3 KG/M2 | DIASTOLIC BLOOD PRESSURE: 78 MMHG

## 2020-11-17 LAB
ALBUMIN SERPL BCP-MCNC: 3.7 G/DL (ref 3.5–5.2)
ALP SERPL-CCNC: 49 U/L (ref 55–135)
ALT SERPL W/O P-5'-P-CCNC: 12 U/L (ref 10–44)
ANION GAP SERPL CALC-SCNC: 9 MMOL/L (ref 8–16)
AST SERPL-CCNC: 13 U/L (ref 10–40)
B-HCG UR QL: NEGATIVE
BASOPHILS # BLD AUTO: 0.03 K/UL (ref 0–0.2)
BASOPHILS NFR BLD: 0.4 % (ref 0–1.9)
BILIRUB SERPL-MCNC: 0.2 MG/DL (ref 0.1–1)
BUN SERPL-MCNC: 10 MG/DL (ref 6–20)
CALCIUM SERPL-MCNC: 9.3 MG/DL (ref 8.7–10.5)
CHLORIDE SERPL-SCNC: 106 MMOL/L (ref 95–110)
CO2 SERPL-SCNC: 25 MMOL/L (ref 23–29)
CREAT SERPL-MCNC: 0.7 MG/DL (ref 0.5–1.4)
CTP QC/QA: YES
DIFFERENTIAL METHOD: ABNORMAL
EOSINOPHIL # BLD AUTO: 0.2 K/UL (ref 0–0.5)
EOSINOPHIL NFR BLD: 2.9 % (ref 0–8)
ERYTHROCYTE [DISTWIDTH] IN BLOOD BY AUTOMATED COUNT: 13.2 % (ref 11.5–14.5)
EST. GFR  (AFRICAN AMERICAN): >60 ML/MIN/1.73 M^2
EST. GFR  (NON AFRICAN AMERICAN): >60 ML/MIN/1.73 M^2
GLUCOSE SERPL-MCNC: 87 MG/DL (ref 70–110)
HCT VFR BLD AUTO: 40 % (ref 37–48.5)
HGB BLD-MCNC: 12.5 G/DL (ref 12–16)
IMM GRANULOCYTES # BLD AUTO: 0.01 K/UL (ref 0–0.04)
IMM GRANULOCYTES NFR BLD AUTO: 0.1 % (ref 0–0.5)
LYMPHOCYTES # BLD AUTO: 3.6 K/UL (ref 1–4.8)
LYMPHOCYTES NFR BLD: 52.3 % (ref 18–48)
MAGNESIUM SERPL-MCNC: 2 MG/DL (ref 1.6–2.6)
MCH RBC QN AUTO: 26.8 PG (ref 27–31)
MCHC RBC AUTO-ENTMCNC: 31.3 G/DL (ref 32–36)
MCV RBC AUTO: 86 FL (ref 82–98)
MONOCYTES # BLD AUTO: 0.5 K/UL (ref 0.3–1)
MONOCYTES NFR BLD: 6.9 % (ref 4–15)
NEUTROPHILS # BLD AUTO: 2.6 K/UL (ref 1.8–7.7)
NEUTROPHILS NFR BLD: 37.4 % (ref 38–73)
NRBC BLD-RTO: 0 /100 WBC
PLATELET # BLD AUTO: 314 K/UL (ref 150–350)
PMV BLD AUTO: 9.7 FL (ref 9.2–12.9)
POTASSIUM SERPL-SCNC: 3.8 MMOL/L (ref 3.5–5.1)
PROT SERPL-MCNC: 7.4 G/DL (ref 6–8.4)
RBC # BLD AUTO: 4.67 M/UL (ref 4–5.4)
SODIUM SERPL-SCNC: 140 MMOL/L (ref 136–145)
WBC # BLD AUTO: 6.84 K/UL (ref 3.9–12.7)

## 2020-11-17 PROCEDURE — 83735 ASSAY OF MAGNESIUM: CPT

## 2020-11-17 PROCEDURE — 80053 COMPREHEN METABOLIC PANEL: CPT

## 2020-11-17 PROCEDURE — 81025 URINE PREGNANCY TEST: CPT | Performed by: EMERGENCY MEDICINE

## 2020-11-17 PROCEDURE — 85025 COMPLETE CBC W/AUTO DIFF WBC: CPT

## 2020-11-17 RX ORDER — CYCLOBENZAPRINE HCL 10 MG
10 TABLET ORAL 3 TIMES DAILY PRN
Qty: 15 TABLET | Refills: 0 | Status: SHIPPED | OUTPATIENT
Start: 2020-11-17 | End: 2020-11-22

## 2020-11-17 NOTE — FIRST PROVIDER EVALUATION
Emergency Department TeleTriage Encounter Note      CHIEF COMPLAINT    Chief Complaint   Patient presents with    Numbness     Patient presents to ED secondary to right sided numbness beginning 10 minutes pta. VAN- in triage. No weakness, no focal deficits, no facial droop.  Also c/o pain to right arm.        VITAL SIGNS   Initial Vitals [11/16/20 2309]   BP Pulse Resp Temp SpO2   125/75 94 18 98.5 °F (36.9 °C) 97 %      MAP       --            ALLERGIES    Review of patient's allergies indicates:  No Known Allergies    PROVIDER TRIAGE NOTE  This is a teletriage evaluation of a 20 y.o. female presenting to the ED with c/o right sided arm pain and numbness. Triage not reports no weakness or focal deficits. No slurred speech on teletriage evaluation.  Initial orders will be placed and care will be transferred to an alternate provider when patient is roomed for a full evaluation. Any additional orders and the final disposition will be determined by that provider.         ORDERS  Labs Reviewed - No data to display    ED Orders (720h ago, onward)    None            Virtual Visit Note: The provider triage portion of this emergency department evaluation and documentation was performed via [employer], a HIPAA-compliant telemedicine application, in concert with a tele-presenter in the room. A face to face patient evaluation with one of my colleagues will occur once the patient is placed in an emergency department room.      DISCLAIMER: This note was prepared with [employer] voice recognition transcription software. Garbled syntax, mangled pronouns, and other bizarre constructions may be attributed to that software system.

## 2020-11-17 NOTE — ED PROVIDER NOTES
Encounter Date: 11/16/2020      COVID Statement  The  of Health and Human Services and Johan Reardon, Governor of the [facility], have declared a State of Public Health Emergency due to the spread of a novel coronavirus and disease (COVID-19).  There is no currently accepted treatment except conservative measures and respiratory support if appropriate.  This has lead to significant resource capacity and potential delays in care.       SCRIBE #1 NOTE: I, Lucero Sevilla, am scribing for, and in the presence of,  Ana Rosa Ellis MD. I have scribed the entire note.       History     Chief Complaint   Patient presents with    Numbness     Patient presents to ED secondary to right sided numbness beginning 10 minutes pta. VAN- in triage. No weakness, no focal deficits, no facial droop.  Also c/o pain to right arm.        Time seen by provider: 12:44 AM on 11/17/2020    The patient is a 19 y/o female with no significant PMHx who presents to the ED with complaint of right arm pain which onset 11 pm.   Patient reports she was riding in the car when her pain began, noting she is on a roadtrip to [place].   She reports her pain worsens with movement, and notes she had decreased sensation to her arm which has resolved.    Pain improved with immobilization.  She has not tried anything for her pain prior to ED arrival.  Denies trauma or injury  Patient denies any fever, chills, chest pain, shortness of breath, and all other sxs at this time.   Denies previous PE or DVT.  She does take birth control pills.  During exam she is sitting up in bed playing on her cell phone.         The history is provided by the patient.     Review of patient's allergies indicates:  No Known Allergies  Past Medical History:   Diagnosis Date    Asthma     Helicobacter pylori antibody positive     History of kidney stones     IBS (irritable bowel syndrome)     STD (sexually transmitted disease)     VUR (vesicoureteric  reflux)      Past Surgical History:   Procedure Laterality Date    MYRINGOTOMY W/ TUBES Bilateral      Family History   Problem Relation Age of Onset    No Known Problems Mother     Hypertension Father     Asthma Father      Social History     Tobacco Use    Smoking status: Never Smoker    Smokeless tobacco: Never Used   Substance Use Topics    Alcohol use: No     Alcohol/week: 0.0 standard drinks     Frequency: Never     Binge frequency: Never    Drug use: No     Review of Systems   Constitutional: Negative for chills and fever.   HENT: Negative for ear pain and sore throat.    Eyes: Negative for redness and visual disturbance.   Respiratory: Negative for cough and shortness of breath.    Cardiovascular: Negative for chest pain and leg swelling.   Gastrointestinal: Negative for abdominal pain, diarrhea and vomiting.   Genitourinary: Negative for dysuria and hematuria.   Musculoskeletal: Positive for arthralgias. Negative for back pain and neck pain.        +right arm pain   Skin: Negative for rash.   Neurological: Positive for numbness (decreased sensation, resolved). Negative for headaches.   Psychiatric/Behavioral: Negative for confusion.       Physical Exam     Initial Vitals [11/16/20 2309]   BP Pulse Resp Temp SpO2   125/75 94 18 98.5 °F (36.9 °C) 97 %      MAP       --         Physical Exam    Nursing note and vitals reviewed.  Constitutional: She appears well-developed and well-nourished. She is not diaphoretic. No distress.   HENT:   Head: Normocephalic and atraumatic.   Right Ear: External ear normal.   Left Ear: External ear normal.   Nose: Nose normal.   Eyes: EOM are normal.   Neck: Normal range of motion. Neck supple.   Cardiovascular: Normal rate, regular rhythm, normal heart sounds and intact distal pulses.   No murmur heard.  Pulses:       Radial pulses are 2+ on the right side and 2+ on the left side.   2+ radial pulses bilaterally   Pulmonary/Chest: Breath sounds normal. No respiratory  distress. She has no wheezes. She has no rales.   Abdominal: Soft. There is no abdominal tenderness.   Musculoskeletal: Normal range of motion. No tenderness or edema.      Right upper arm: Normal. She exhibits no tenderness, no bony tenderness, no swelling, no edema and no deformity.      Right forearm: Normal. She exhibits no tenderness, no bony tenderness, no swelling, no edema and no deformity.      Comments: RUE without deformity  No warmth, swelling, erythema, edema, TTP  ROM intact to right shoulder, elbow and wrist.  Sensation intact and 2+ radial pulse   Neurological: She is alert and oriented to person, place, and time. She has normal strength. No cranial nerve deficit or sensory deficit. GCS score is 15. GCS eye subscore is 4. GCS verbal subscore is 5. GCS motor subscore is 6.   No focal deficits  Normal finger to nose testing bilaterally  Sensation symmetric in UEs bilaterally  No pronator drift   Skin: Skin is warm and dry. Capillary refill takes less than 2 seconds. No rash noted.   Psychiatric: She has a normal mood and affect. Thought content normal.         ED Course   Procedures  Labs Reviewed   CBC W/ AUTO DIFFERENTIAL - Abnormal; Notable for the following components:       Result Value    MCH 26.8 (*)     MCHC 31.3 (*)     Gran % 37.4 (*)     Lymph % 52.3 (*)     All other components within normal limits   COMPREHENSIVE METABOLIC PANEL - Abnormal; Notable for the following components:    Alkaline Phosphatase 49 (*)     All other components within normal limits   MAGNESIUM   POCT URINE PREGNANCY   POCT GLUCOSE   POCT GLUCOSE MONITORING CONTINUOUS          Imaging Results          US Upper Extremity Veins Right (Final result)  Result time 11/17/20 01:58:06    Final result by Merle Luna MD (11/17/20 01:58:06)                 Impression:      No thrombus in central veins of the right upper extremity.      Electronically signed by: Merle Luna MD  Date:    11/17/2020  Time:    01:58              Narrative:    EXAMINATION:  US UPPER EXTREMITY VEINS RIGHT    CLINICAL HISTORY:  right arm pain;    TECHNIQUE:  Duplex and color flow Doppler evaluation and dynamic compression was performed of the right upper extremity veins.    COMPARISON:  None    FINDINGS:  Central veins: The internal jugular, subclavian, and axillary veins are patent and free of thrombus.    Arm veins: The brachial, basilic, and cephalic veins are patent and compressible.    Contralateral subclavian/internal jugular veins: The left subclavian and internal jugular veins are patent and free of thrombus.    Other findings: None.                               CT Head Without Contrast (Final result)  Result time 11/17/20 00:39:15    Final result by Silvio Watters MD (11/17/20 00:39:15)                 Impression:      No acute abnormality.      Electronically signed by: Silvio Watters  Date:    11/17/2020  Time:    00:39             Narrative:    EXAMINATION:  CT HEAD WITHOUT CONTRAST    CLINICAL HISTORY:  Neuro deficit, acute, stroke suspected;    TECHNIQUE:  Low dose axial CT images obtained throughout the head without intravenous contrast. Sagittal and coronal reconstructions were performed.    COMPARISON:  None.    FINDINGS:  Intracranial compartment:    Ventricles and sulci are normal in size for age without evidence of hydrocephalus. No extra-axial blood or fluid collections.    The brain parenchyma appears normal. No parenchymal mass, hemorrhage, edema or major vascular distribution infarct.    Skull/extracranial contents (limited evaluation): No fracture. Mastoid air cells and paranasal sinuses are essentially clear.                                 Medical Decision Making:   History:   Old Medical Records: I decided to obtain old medical records.  Initial Assessment:   The patient is a 20 y.o. female who presents to the ED with complaint of right arm pain which onset 11 pm. The patient was seen and examined. The history and physical  exam was obtained. The nursing notes and vital signs were reviewed.     Secondary to symptoms and exam findings we ordered:    Labs: magnesium, POCT urine pregnancy, CBC, CMP, POCT glucose    Imaging: US upper extremity, CT head    Patient will be monitored here.  Differential Diagnosis:   Differential Diagnosis includes, but is not limited to:  CVA/TIA, vertigo, anemia/blood loss, cardiogenic shock, arrhythmia, orthostatic hypotension, dehydration, medication side effect, vitamin deficiency, liver disease, hypothyroidism, drug intoxication/withdrawal, metabolic derangement.  Clinical Tests:   Lab Tests: Ordered and Reviewed  Radiological Study: Ordered and Reviewed  ED Management:    On re-evaluation, the patient's status has improved.  After complete ED evaluation, clinical impression is most consistent with right arm pain.  PCP follow-up within 2-3 days was recommended.    After taking into careful account the patient's history, physical exam findings, as well as empirical and objective data obtained throughout ED workup, I feel no emergent medical condition has been identified. No further evaluation or admission was felt to be required, and the patient is stable for discharge from the ED. The patient and any additional family present were updated with test results, overall clinical impression, and recommended further plan of care, including discharge instructions as provided and outpatient follow-up for continued evaluation and management as needed. All questions were answered. The patient expressed understanding and agreed with current plan for discharge and follow-up plan of care. Strict ED return precautions were provided, including return/worsening of current symptoms, new symptoms, or any other concerns.                     ED Course as of Nov 17 0415   Mon Nov 16, 2020 2331 BP: 125/75 [LD]   2331 Temp: 98.5 °F (36.9 °C) [LD]   2331 Pulse: 94 [LD]   2331 Resp: 18 [LD]   2331 SpO2: 97 % [LD]   Tue Nov 17,  2020 0031 Preg Test, Ur: Negative [LD]   0031 POCT Glucose: 109 [LD]   0246 Patient denies any numbness or decreased sensation to her RUE    [LD]      ED Course User Index  [LD] Ana Rosa Ellis MD            Clinical Impression:     ICD-10-CM ICD-9-CM   1. Right arm pain  M79.601 729.5                      Disposition:   Disposition: Discharged  Condition: Stable     ED Disposition Condition    Discharge Stable        ED Prescriptions     Medication Sig Dispense Start Date End Date Auth. Provider    cyclobenzaprine (FLEXERIL) 10 MG tablet Take 1 tablet (10 mg total) by mouth 3 (three) times daily as needed for Muscle spasms. 15 tablet 11/17/2020 11/22/2020 Ana Rosa Ellis MD        Follow-up Information     Follow up With Specialties Details Why Contact Info Additional Information    [facility] Call today to arrange outpatient follow up with a primary care provider [address], [address]  [facility] [address] [phone] At this time [facility] will only use these entries [facility], [facility], and Emergency Department due to COVID-19 precautions.                           I, Ana Rosa Ellis,  personally performed the services described in this documentation. All medical record entries made by the scribe were at my direction and in my presence.  I have reviewed the chart and agree that the record reflects my personal performance and is accurate and complete. Ana Rosa Ellis M.D. 4:15 AM11/17/2020               Ana Rosa Ellis MD  11/17/20 0415

## 2020-11-17 NOTE — ED NOTES
Pt to ED with c/o R sided arm numbness and R arm pain beginning 10 minutes pta. Pt states she was in the car when she felt her R arm tingling then it became numb after a few minutes. Mother brought pt into ED for evaluation. Pt reports numbness has resolved. Pt denies weakness to extremity and vision changes.    Patient identifiers for Анна Garcia verified by spelling and stated name on armband along with .     Review of patient's allergies indicates:  No Known Allergies     APPEARANCE: Alert, oriented and in no acute distress.  CARDIAC: Normal rate and rhythm, no murmur heard.   PERIPHERAL VASCULAR: peripheral pulses present. Normal cap refill. No edema. Warm to touch.    RESPIRATORY:Normal rate and effort, breath sounds clear bilaterally throughout chest. Respirations are equal and unlabored no obvious signs of distress.  GASTRO: soft, bowel sounds normal, no tenderness, no abdominal distention.  MUSC: Full ROM. No obvious deformity. + R arm pain  SKIN: Skin is warm and dry, normal skin turgor, mucous membranes moist.  NEURO: 5/5 strength major flexors/extensors bilaterally. Sensory intact to light touch bilaterally. [place] coma scale: eyes open spontaneously-4, oriented & converses-5, obeys commands-6. No neurological abnormalities.   MENTAL STATUS: awake, alert and aware of environment.  EYE: PERRL, both eyes: pupils brisk and reactive to light. Normal size.

## 2021-03-27 ENCOUNTER — HOSPITAL ENCOUNTER (EMERGENCY)
Facility: HOSPITAL | Age: 21
Discharge: HOME | End: 2021-03-27
Attending: EMERGENCY MEDICINE
Payer: MEDICAID

## 2021-03-27 VITALS
OXYGEN SATURATION: 100 % | WEIGHT: 172 LBS | BODY MASS INDEX: 30.48 KG/M2 | HEART RATE: 84 BPM | TEMPERATURE: 99 F | HEIGHT: 63 IN | SYSTOLIC BLOOD PRESSURE: 111 MMHG | DIASTOLIC BLOOD PRESSURE: 71 MMHG | RESPIRATION RATE: 20 BRPM

## 2021-03-27 DIAGNOSIS — R10.13 EPIGASTRIC ABDOMINAL PAIN: ICD-10-CM

## 2021-03-27 DIAGNOSIS — R11.2 NON-INTRACTABLE VOMITING WITH NAUSEA, UNSPECIFIED VOMITING TYPE: Primary | ICD-10-CM

## 2021-03-27 DIAGNOSIS — K52.9 GASTROENTERITIS: ICD-10-CM

## 2021-03-27 LAB
B-HCG UR QL: NEGATIVE
BASOPHILS # BLD AUTO: 0.02 K/UL (ref 0–0.2)
BASOPHILS NFR BLD: 0.4 % (ref 0–1.9)
BILIRUB UR QL STRIP: NEGATIVE
BUN SERPL-MCNC: 12 MG/DL (ref 6–30)
CHLORIDE SERPL-SCNC: 105 MMOL/L (ref 95–110)
CLARITY UR REFRACT.AUTO: ABNORMAL
COLOR UR AUTO: YELLOW
CREAT SERPL-MCNC: 0.6 MG/DL (ref 0.5–1.4)
CTP QC/QA: YES
CTP QC/QA: YES
DIFFERENTIAL METHOD: ABNORMAL
EOSINOPHIL # BLD AUTO: 0.1 K/UL (ref 0–0.5)
EOSINOPHIL NFR BLD: 1.7 % (ref 0–8)
ERYTHROCYTE [DISTWIDTH] IN BLOOD BY AUTOMATED COUNT: 13.2 % (ref 11.5–14.5)
GLUCOSE SERPL-MCNC: 81 MG/DL (ref 70–110)
GLUCOSE UR QL STRIP: NEGATIVE
HCT VFR BLD AUTO: 37.2 % (ref 37–48.5)
HCT VFR BLD CALC: 38 %PCV (ref 36–54)
HGB BLD-MCNC: 11.8 G/DL (ref 12–16)
HGB UR QL STRIP: NEGATIVE
IMM GRANULOCYTES # BLD AUTO: 0.01 K/UL (ref 0–0.04)
IMM GRANULOCYTES NFR BLD AUTO: 0.2 % (ref 0–0.5)
KETONES UR QL STRIP: ABNORMAL
LEUKOCYTE ESTERASE UR QL STRIP: NEGATIVE
LYMPHOCYTES # BLD AUTO: 2.7 K/UL (ref 1–4.8)
LYMPHOCYTES NFR BLD: 49.8 % (ref 18–48)
MCH RBC QN AUTO: 26.9 PG (ref 27–31)
MCHC RBC AUTO-ENTMCNC: 31.7 G/DL (ref 32–36)
MCV RBC AUTO: 85 FL (ref 82–98)
MICROSCOPIC COMMENT: NORMAL
MONOCYTES # BLD AUTO: 0.4 K/UL (ref 0.3–1)
MONOCYTES NFR BLD: 7.2 % (ref 4–15)
NEUTROPHILS # BLD AUTO: 2.2 K/UL (ref 1.8–7.7)
NEUTROPHILS NFR BLD: 40.7 % (ref 38–73)
NITRITE UR QL STRIP: NEGATIVE
NRBC BLD-RTO: 0 /100 WBC
PH UR STRIP: 5 [PH] (ref 5–8)
PLATELET # BLD AUTO: 284 K/UL (ref 150–350)
PMV BLD AUTO: 9.3 FL (ref 9.2–12.9)
POC IONIZED CALCIUM: 1.17 MMOL/L (ref 1.06–1.42)
POC TCO2 (MEASURED): 25 MMOL/L (ref 23–29)
POTASSIUM BLD-SCNC: 3.8 MMOL/L (ref 3.5–5.1)
PROT UR QL STRIP: NEGATIVE
RBC # BLD AUTO: 4.39 M/UL (ref 4–5.4)
RBC #/AREA URNS AUTO: 2 /HPF (ref 0–4)
SAMPLE: NORMAL
SARS-COV-2 RDRP RESP QL NAA+PROBE: NEGATIVE
SODIUM BLD-SCNC: 140 MMOL/L (ref 136–145)
SP GR UR STRIP: 1.03 (ref 1–1.03)
SQUAMOUS #/AREA URNS AUTO: 1 /HPF
URN SPEC COLLECT METH UR: ABNORMAL
WBC # BLD AUTO: 5.4 K/UL (ref 3.9–12.7)
WBC #/AREA URNS AUTO: 3 /HPF (ref 0–5)

## 2021-03-27 PROCEDURE — U0002 COVID-19 LAB TEST NON-CDC: HCPCS | Performed by: EMERGENCY MEDICINE

## 2021-03-27 PROCEDURE — 99284 PR EMERGENCY DEPT VISIT,LEVEL IV: ICD-10-PCS | Mod: CS,,, | Performed by: EMERGENCY MEDICINE

## 2021-03-27 PROCEDURE — 81001 URINALYSIS AUTO W/SCOPE: CPT | Performed by: EMERGENCY MEDICINE

## 2021-03-27 PROCEDURE — 99283 EMERGENCY DEPT VISIT LOW MDM: CPT | Mod: 25

## 2021-03-27 PROCEDURE — 25000003 PHARM REV CODE 250: Performed by: EMERGENCY MEDICINE

## 2021-03-27 PROCEDURE — 80047 BASIC METABLC PNL IONIZED CA: CPT

## 2021-03-27 PROCEDURE — 81025 URINE PREGNANCY TEST: CPT | Performed by: EMERGENCY MEDICINE

## 2021-03-27 PROCEDURE — 85025 COMPLETE CBC W/AUTO DIFF WBC: CPT | Performed by: EMERGENCY MEDICINE

## 2021-03-27 PROCEDURE — 99284 EMERGENCY DEPT VISIT MOD MDM: CPT | Mod: CS,,, | Performed by: EMERGENCY MEDICINE

## 2021-03-27 RX ORDER — ONDANSETRON 4 MG/1
4 TABLET, FILM COATED ORAL EVERY 6 HOURS
Qty: 12 TABLET | Refills: 0 | Status: SHIPPED | OUTPATIENT
Start: 2021-03-27 | End: 2021-04-30 | Stop reason: SDUPTHER

## 2021-03-27 RX ORDER — ONDANSETRON 4 MG/1
4 TABLET, ORALLY DISINTEGRATING ORAL
Status: COMPLETED | OUTPATIENT
Start: 2021-03-27 | End: 2021-03-27

## 2021-03-27 RX ADMIN — ONDANSETRON 4 MG: 4 TABLET, ORALLY DISINTEGRATING ORAL at 07:03

## 2021-04-09 ENCOUNTER — HOSPITAL ENCOUNTER (EMERGENCY)
Facility: HOSPITAL | Age: 21
Discharge: HOME | End: 2021-04-09
Attending: EMERGENCY MEDICINE
Payer: MEDICAID

## 2021-04-09 VITALS
TEMPERATURE: 98 F | HEART RATE: 82 BPM | DIASTOLIC BLOOD PRESSURE: 67 MMHG | RESPIRATION RATE: 16 BRPM | OXYGEN SATURATION: 100 % | SYSTOLIC BLOOD PRESSURE: 116 MMHG

## 2021-04-09 DIAGNOSIS — R11.10 VOMITING IN PEDIATRIC PATIENT: ICD-10-CM

## 2021-04-09 DIAGNOSIS — R51.9 ACUTE NONINTRACTABLE HEADACHE, UNSPECIFIED HEADACHE TYPE: Primary | ICD-10-CM

## 2021-04-09 LAB
B-HCG UR QL: NEGATIVE
BACTERIA #/AREA URNS AUTO: ABNORMAL /HPF
BILIRUB UR QL STRIP: NEGATIVE
CLARITY UR REFRACT.AUTO: ABNORMAL
COLOR UR AUTO: YELLOW
CTP QC/QA: YES
GLUCOSE UR QL STRIP: NEGATIVE
HGB UR QL STRIP: NEGATIVE
KETONES UR QL STRIP: ABNORMAL
LEUKOCYTE ESTERASE UR QL STRIP: ABNORMAL
MICROSCOPIC COMMENT: ABNORMAL
NITRITE UR QL STRIP: NEGATIVE
PH UR STRIP: 5 [PH] (ref 5–8)
PROT UR QL STRIP: NEGATIVE
RBC #/AREA URNS AUTO: 11 /HPF (ref 0–4)
SP GR UR STRIP: 1.03 (ref 1–1.03)
SQUAMOUS #/AREA URNS AUTO: 6 /HPF
URN SPEC COLLECT METH UR: ABNORMAL
WBC #/AREA URNS AUTO: 7 /HPF (ref 0–5)

## 2021-04-09 PROCEDURE — 99284 PR EMERGENCY DEPT VISIT,LEVEL IV: ICD-10-PCS | Mod: ,,, | Performed by: EMERGENCY MEDICINE

## 2021-04-09 PROCEDURE — 25000003 PHARM REV CODE 250: Performed by: EMERGENCY MEDICINE

## 2021-04-09 PROCEDURE — 99284 EMERGENCY DEPT VISIT MOD MDM: CPT | Mod: ,,, | Performed by: EMERGENCY MEDICINE

## 2021-04-09 PROCEDURE — 25000003 PHARM REV CODE 250: Performed by: STUDENT IN AN ORGANIZED HEALTH CARE EDUCATION/TRAINING PROGRAM

## 2021-04-09 PROCEDURE — 81001 URINALYSIS AUTO W/SCOPE: CPT | Performed by: STUDENT IN AN ORGANIZED HEALTH CARE EDUCATION/TRAINING PROGRAM

## 2021-04-09 PROCEDURE — 81025 URINE PREGNANCY TEST: CPT | Performed by: EMERGENCY MEDICINE

## 2021-04-09 PROCEDURE — 99283 EMERGENCY DEPT VISIT LOW MDM: CPT | Mod: 25

## 2021-04-09 RX ORDER — FAMOTIDINE 20 MG/1
20 TABLET, FILM COATED ORAL
Status: COMPLETED | OUTPATIENT
Start: 2021-04-09 | End: 2021-04-09

## 2021-04-09 RX ORDER — ONDANSETRON 4 MG/1
8 TABLET, ORALLY DISINTEGRATING ORAL EVERY 12 HOURS PRN
Qty: 3 TABLET | Refills: 0 | Status: SHIPPED | OUTPATIENT
Start: 2021-04-09 | End: 2021-06-07

## 2021-04-09 RX ORDER — KETOROLAC TROMETHAMINE 10 MG/1
10 TABLET, FILM COATED ORAL
Status: COMPLETED | OUTPATIENT
Start: 2021-04-09 | End: 2021-04-09

## 2021-04-09 RX ORDER — ACETAMINOPHEN 325 MG/1
650 TABLET ORAL
Status: COMPLETED | OUTPATIENT
Start: 2021-04-09 | End: 2021-04-09

## 2021-04-09 RX ORDER — ONDANSETRON 8 MG/1
8 TABLET, ORALLY DISINTEGRATING ORAL ONCE
Status: COMPLETED | OUTPATIENT
Start: 2021-04-09 | End: 2021-04-09

## 2021-04-09 RX ADMIN — ACETAMINOPHEN 650 MG: 325 TABLET ORAL at 05:04

## 2021-04-09 RX ADMIN — FAMOTIDINE 20 MG: 20 TABLET, FILM COATED ORAL at 06:04

## 2021-04-09 RX ADMIN — ONDANSETRON 8 MG: 8 TABLET, ORALLY DISINTEGRATING ORAL at 04:04

## 2021-04-09 RX ADMIN — KETOROLAC TROMETHAMINE 10 MG: 10 TABLET, FILM COATED ORAL at 06:04

## 2021-05-06 ENCOUNTER — PATIENT MESSAGE (OUTPATIENT)
Dept: RESEARCH | Facility: HOSPITAL | Age: 21
End: 2021-05-06

## 2021-05-10 ENCOUNTER — PATIENT MESSAGE (OUTPATIENT)
Dept: RESEARCH | Facility: HOSPITAL | Age: 21
End: 2021-05-10

## 2021-08-09 PROBLEM — J20.9 ACUTE BRONCHITIS WITH ASTHMA: Status: ACTIVE | Noted: 2021-08-09

## 2021-08-09 PROBLEM — J45.909 ACUTE BRONCHITIS WITH ASTHMA: Status: ACTIVE | Noted: 2021-08-09

## 2021-10-12 LAB
CHLAMYDIA BY NAA: POSITIVE
NEISSERIA GONORRHOEAE, NAA: NEGATIVE

## 2022-01-12 ENCOUNTER — HOSPITAL ENCOUNTER (EMERGENCY)
Facility: HOSPITAL | Age: 22
Discharge: HOME | End: 2022-01-13
Attending: EMERGENCY MEDICINE
Payer: MEDICAID

## 2022-01-12 VITALS
TEMPERATURE: 98 F | DIASTOLIC BLOOD PRESSURE: 89 MMHG | WEIGHT: 179 LBS | BODY MASS INDEX: 31.71 KG/M2 | HEIGHT: 63 IN | OXYGEN SATURATION: 98 % | HEART RATE: 105 BPM | SYSTOLIC BLOOD PRESSURE: 120 MMHG | RESPIRATION RATE: 18 BRPM

## 2022-01-12 DIAGNOSIS — O23.42 URINARY TRACT INFECTION IN MOTHER DURING SECOND TRIMESTER OF PREGNANCY: ICD-10-CM

## 2022-01-12 DIAGNOSIS — U07.1 LAB TEST POSITIVE FOR DETECTION OF COVID-19 VIRUS: Primary | ICD-10-CM

## 2022-01-12 LAB
ABO GROUP BLD: NORMAL
ALBUMIN SERPL BCP-MCNC: 3.1 G/DL (ref 3.5–5.2)
ALP SERPL-CCNC: 41 U/L (ref 55–135)
ALT SERPL W/O P-5'-P-CCNC: 11 U/L (ref 10–44)
ANION GAP SERPL CALC-SCNC: 11 MMOL/L (ref 8–16)
AST SERPL-CCNC: 12 U/L (ref 10–40)
B-HCG UR QL: POSITIVE
BACTERIA #/AREA URNS HPF: NEGATIVE /HPF
BASOPHILS # BLD AUTO: 0.02 K/UL (ref 0–0.2)
BASOPHILS NFR BLD: 0.5 % (ref 0–1.9)
BILIRUB SERPL-MCNC: 0.5 MG/DL (ref 0.1–1)
BILIRUB UR QL STRIP: NEGATIVE
BUN SERPL-MCNC: 7 MG/DL (ref 6–20)
CALCIUM SERPL-MCNC: 8.6 MG/DL (ref 8.7–10.5)
CHLORIDE SERPL-SCNC: 102 MMOL/L (ref 95–110)
CLARITY UR: CLEAR
CO2 SERPL-SCNC: 21 MMOL/L (ref 23–29)
COLOR UR: YELLOW
CREAT SERPL-MCNC: 0.4 MG/DL (ref 0.5–1.4)
CTP QC/QA: YES
DIFFERENTIAL METHOD: ABNORMAL
EOSINOPHIL # BLD AUTO: 0.1 K/UL (ref 0–0.5)
EOSINOPHIL NFR BLD: 2 % (ref 0–8)
ERYTHROCYTE [DISTWIDTH] IN BLOOD BY AUTOMATED COUNT: 13.9 % (ref 11.5–14.5)
EST. GFR  (AFRICAN AMERICAN): >60 ML/MIN/1.73 M^2
EST. GFR  (NON AFRICAN AMERICAN): >60 ML/MIN/1.73 M^2
GLUCOSE SERPL-MCNC: 94 MG/DL (ref 70–110)
GLUCOSE UR QL STRIP: NEGATIVE
HCG INTACT+B SERPL-ACNC: NORMAL MIU/ML
HCT VFR BLD AUTO: 32.8 % (ref 37–48.5)
HGB BLD-MCNC: 10.5 G/DL (ref 12–16)
HGB UR QL STRIP: NEGATIVE
HYALINE CASTS #/AREA URNS LPF: 11 /LPF
IMM GRANULOCYTES # BLD AUTO: 0 K/UL (ref 0–0.04)
IMM GRANULOCYTES NFR BLD AUTO: 0 % (ref 0–0.5)
KETONES UR QL STRIP: NEGATIVE
LEUKOCYTE ESTERASE UR QL STRIP: ABNORMAL
LYMPHOCYTES # BLD AUTO: 0.9 K/UL (ref 1–4.8)
LYMPHOCYTES NFR BLD: 22.1 % (ref 18–48)
MCH RBC QN AUTO: 26.6 PG (ref 27–31)
MCHC RBC AUTO-ENTMCNC: 32 G/DL (ref 32–36)
MCV RBC AUTO: 83 FL (ref 82–98)
MICROSCOPIC COMMENT: ABNORMAL
MONOCYTES # BLD AUTO: 0.6 K/UL (ref 0.3–1)
MONOCYTES NFR BLD: 14.8 % (ref 4–15)
NEUTROPHILS # BLD AUTO: 2.4 K/UL (ref 1.8–7.7)
NEUTROPHILS NFR BLD: 60.6 % (ref 38–73)
NITRITE UR QL STRIP: NEGATIVE
NRBC BLD-RTO: 0 /100 WBC
PH UR STRIP: 7 [PH] (ref 5–8)
PLATELET # BLD AUTO: 236 K/UL (ref 150–450)
PMV BLD AUTO: 9.5 FL (ref 9.2–12.9)
POTASSIUM SERPL-SCNC: 3.5 MMOL/L (ref 3.5–5.1)
PROT SERPL-MCNC: 6.5 G/DL (ref 6–8.4)
PROT UR QL STRIP: ABNORMAL
RBC # BLD AUTO: 3.95 M/UL (ref 4–5.4)
RBC #/AREA URNS HPF: 1 /HPF (ref 0–4)
RH BLD: NORMAL
SODIUM SERPL-SCNC: 134 MMOL/L (ref 136–145)
SP GR UR STRIP: 1.03 (ref 1–1.03)
SQUAMOUS #/AREA URNS HPF: 7 /HPF
URN SPEC COLLECT METH UR: ABNORMAL
UROBILINOGEN UR STRIP-ACNC: NEGATIVE EU/DL
WBC # BLD AUTO: 3.98 K/UL (ref 3.9–12.7)
WBC #/AREA URNS HPF: 3 /HPF (ref 0–5)

## 2022-01-12 PROCEDURE — 80053 COMPREHEN METABOLIC PANEL: CPT | Performed by: EMERGENCY MEDICINE

## 2022-01-12 PROCEDURE — 36000 PLACE NEEDLE IN VEIN: CPT

## 2022-01-12 PROCEDURE — 85025 COMPLETE CBC W/AUTO DIFF WBC: CPT | Performed by: EMERGENCY MEDICINE

## 2022-01-12 PROCEDURE — 86901 BLOOD TYPING SEROLOGIC RH(D): CPT | Performed by: EMERGENCY MEDICINE

## 2022-01-12 PROCEDURE — 84702 CHORIONIC GONADOTROPIN TEST: CPT | Performed by: EMERGENCY MEDICINE

## 2022-01-12 PROCEDURE — 99284 EMERGENCY DEPT VISIT MOD MDM: CPT | Mod: 25

## 2022-01-12 PROCEDURE — 25000003 PHARM REV CODE 250: Performed by: EMERGENCY MEDICINE

## 2022-01-12 PROCEDURE — 81001 URINALYSIS AUTO W/SCOPE: CPT | Performed by: EMERGENCY MEDICINE

## 2022-01-12 PROCEDURE — 81025 URINE PREGNANCY TEST: CPT | Performed by: EMERGENCY MEDICINE

## 2022-01-12 RX ORDER — CEPHALEXIN 500 MG/1
500 CAPSULE ORAL EVERY 12 HOURS
Qty: 20 CAPSULE | Refills: 0 | Status: SHIPPED | OUTPATIENT
Start: 2022-01-12 | End: 2022-01-17

## 2022-01-12 RX ORDER — ACETAMINOPHEN 325 MG/1
325 TABLET ORAL EVERY 6 HOURS PRN
Status: ON HOLD | COMMUNITY
End: 2022-06-01

## 2022-01-12 RX ADMIN — SODIUM CHLORIDE 1000 ML: 0.9 INJECTION, SOLUTION INTRAVENOUS at 10:01

## 2022-01-12 NOTE — Clinical Note
"Анна "Анна" Radha was seen and treated in our emergency department on 1/12/2022.  She may return to work on 01/17/2022.       If you have any questions or concerns, please don't hesitate to call.      ARABELLA Nazario"

## 2022-01-13 NOTE — DISCHARGE INSTRUCTIONS
Home quarantine as discussed please follow-up with your OBGYN as directed for definitive care  Return for any concerns

## 2022-01-13 NOTE — ED PROVIDER NOTES
Encounter Date: 1/12/2022       History     Chief Complaint   Patient presents with    pregnancy concerns     21-year-old well-appearing female presents emergency department reports she is approximately 19 weeks pregnant, reports this is her 1st pregnancy she has a OBGYN at home a however she tested positive for COVID yesterday she currently resides with her grandmother who had a transplant therefore she is in [place] quarantine in with other family.  Patient denies pelvic cramping, or vaginal bleeding she has had no vaginal discharge she states that she came to the emergency department earlier today because she did not have good fetal movements she states it was so busy that she left and she returned to be checked.  Patient denies fevers chest pain shortness of breath pleuritic pain leg swelling or fevers.        Review of patient's allergies indicates:  No Known Allergies  Past Medical History:   Diagnosis Date    Asthma     Helicobacter pylori antibody positive     History of kidney stones     IBS (irritable bowel syndrome)     STD (sexually transmitted disease)     VUR (vesicoureteric reflux)      Past Surgical History:   Procedure Laterality Date    MYRINGOTOMY W/ TUBES Bilateral      Family History   Problem Relation Age of Onset    No Known Problems Mother     Hypertension Father     Asthma Father      Social History     Tobacco Use    Smoking status: Never Smoker    Smokeless tobacco: Never Used   Substance Use Topics    Alcohol use: No     Alcohol/week: 0.0 standard drinks    Drug use: No     Review of Systems   Constitutional: Negative for chills and fever.   HENT: Negative.    Respiratory: Negative.    Cardiovascular: Negative.    Gastrointestinal: Negative.    Genitourinary:        Decreased fetal movement   Musculoskeletal: Negative.    Skin: Negative.    Neurological: Negative.    Hematological: Negative.    Psychiatric/Behavioral: Negative.        Physical Exam     Initial Vitals  [01/12/22 2201]   BP Pulse Resp Temp SpO2   120/89 105 18 98.4 °F (36.9 °C) 98 %      MAP       --         Physical Exam    Nursing note and vitals reviewed.  Constitutional: She appears well-developed and well-nourished.   HENT:   Head: Normocephalic and atraumatic.   Eyes: Conjunctivae and EOM are normal. Pupils are equal, round, and reactive to light.   Neck: Neck supple.   Normal range of motion.  Cardiovascular: Normal rate, regular rhythm, normal heart sounds and intact distal pulses.   Pulmonary/Chest: Breath sounds normal.   Abdominal: Abdomen is soft. Bowel sounds are normal.   Musculoskeletal:         General: Normal range of motion.      Cervical back: Normal range of motion and neck supple.     Neurological: She is alert and oriented to person, place, and time. She has normal strength. GCS score is 15. GCS eye subscore is 4. GCS verbal subscore is 5. GCS motor subscore is 6.   Skin: Skin is warm and dry.   Psychiatric: She has a normal mood and affect.         ED Course   Procedures  Labs Reviewed   CBC W/ AUTO DIFFERENTIAL - Abnormal; Notable for the following components:       Result Value    RBC 3.95 (*)     Hemoglobin 10.5 (*)     Hematocrit 32.8 (*)     MCH 26.6 (*)     Lymph # 0.9 (*)     All other components within normal limits    Narrative:     Release to patient->Immediate   COMPREHENSIVE METABOLIC PANEL - Abnormal; Notable for the following components:    Sodium 134 (*)     CO2 21 (*)     Creatinine 0.4 (*)     Calcium 8.6 (*)     Albumin 3.1 (*)     Alkaline Phosphatase 41 (*)     All other components within normal limits    Narrative:     Release to patient->Immediate   URINALYSIS, REFLEX TO URINE CULTURE - Abnormal; Notable for the following components:    Protein, UA Trace (*)     Leukocytes, UA Trace (*)     All other components within normal limits    Narrative:     Specimen Source->Urine   URINALYSIS MICROSCOPIC - Abnormal; Notable for the following components:    Hyaline Casts, UA 11  (*)     All other components within normal limits    Narrative:     Specimen Source->Urine   POCT URINE PREGNANCY - Abnormal; Notable for the following components:    POC Preg Test, Ur Positive (*)     All other components within normal limits   HCG, QUANTITATIVE    Narrative:     Release to patient->Immediate   GROUP & RH          Imaging Results          US OB 14+ Wks, TransAbd, Single Gestation (Final result)  Result time 01/12/22 23:18:24    Final result by Danial Ramos MD (01/12/22 23:18:24)                 Narrative:    EXAM DESCRIPTION: US OB 14+ WEEKS, TRANSABDOM, SINGLE GESTATION      CLINICAL HISTORY: Abdominal discomfort.    COMPARISON: December 5, 2021.    FINDINGS:  A single gestation is breech in position with a regular heart rate of 164 bpm. The placenta is posterior grade 0, low lying but appears grossly free the cervix. Amniotic fluid volume appears appropriate. No retroplacental hemorrhage is demonstrated. Fetal movement is present.    Cord insertion, spine, three-vessel cord are unremarkable. Complete anatomic survey not performed.    MEASUREMENTS  BPD: 4.2 cm corresponding to a 18 week 5 day gestation.  HC: 16.5 cm corresponding to a 19 week 1 day gestation.  AC: 14.3 cm corresponding to a 19 week 5 day gestation.  FL: 3.0 cm corresponding to a 19 week 2 day gestation.    GESTATIONAL AGE:  Average ultrasound age: 19 weeks and 2 days  Estimated date of delivery of June 6, 2022.  Fetal weight: 294 grams (0 lbs, 10 oz.)    Prior ultrasound of December 5, 2021 gave an estimated date of delivery of June 2, 2022.      IMPRESSION:  Single breech position fetus at 19 weeks, two days gestation. Appropriate growth since the previous study. No abnormality demonstrated on limited OB ultrasound.    Electronically signed by:  Danial Ramos MD  1/12/2022 11:18 PM [facility] Workstation: [id number]                               Medications   sodium chloride 0.9% bolus 1,000 mL (1,000 mLs Intravenous New Bag  1/12/22 2216)     Medical Decision Making:   Initial Assessment:   21-year-old well-appearing female presents emergency department reports she is approximately 19 weeks pregnant, reports this is her 1st pregnancy she has a OBGYN at home a however she tested positive for COVID yesterday she currently resides with her grandmother who had a transplant therefore she is in [place] quarantine in with other family.  Patient denies pelvic cramping, or vaginal bleeding she has had no vaginal discharge she states that she came to the emergency department earlier today because she did not have good fetal movements she states it was so busy that she left and she returned to be checked.  Patient denies fevers chest pain shortness of breath pleuritic pain leg swelling or fevers.        ED Management:  21-year-old female presents to the emergency department currently approximately 19 weeks pregnant 1st pregnancy here requesting for her baby to be checked secondary to decreased fetal movements.  The patient denies pelvic pain, vaginal bleeding or discharge.  She states she tested positive for COVID yesterday she has been previously vaccinated patient has no chest pain shortness of breath pleuritic pain, calf swelling or pain she is not tachycardic she has no respiratory distress she has no symptoms consistent with pneumonia or PE patient does not wish to have a chest x-ray.  Patient was seen for the same complaint yesterday at [facility] and home a ultrasound performed patient has normal gestational age fetus at 19 weeks with positive fetal movement and heart tones.  UTI noted patient will be discharged home with Keflex patient wishes to of defer pelvic exam secondary to no vaginal bleeding or discharge.                      Clinical Impression:   Final diagnoses:  [U07.1] Lab test positive for detection of COVID-19 virus (Primary)  [O23.42] Urinary tract infection in mother during second trimester of pregnancy          ED  Disposition Condition    Discharge Stable        ED Prescriptions     Medication Sig Dispense Start Date End Date Auth. Provider    cephALEXin (KEFLEX) 500 MG capsule Take 1 capsule (500 mg total) by mouth every 12 (twelve) hours. for 5 days 20 capsule 1/12/2022 1/17/2022 ARABELLA Nazario        Follow-up Information     Follow up With Specialties Details Why Contact Info    Brid[facility] Luna II, [facility]   [address] [address]  [phone]             ARABELLA Nazario  01/13/22 0013

## 2022-01-23 PROBLEM — Z86.16 HISTORY OF COVID-19: Status: ACTIVE | Noted: 2022-01-23

## 2022-01-24 ENCOUNTER — PATIENT MESSAGE (OUTPATIENT)
Dept: ADMINISTRATIVE | Facility: HOSPITAL | Age: 22
End: 2022-01-24
Payer: MEDICAID

## 2022-04-04 ENCOUNTER — PATIENT MESSAGE (OUTPATIENT)
Dept: ADMINISTRATIVE | Facility: HOSPITAL | Age: 22
End: 2022-04-04
Payer: MEDICAID

## 2022-04-17 PROBLEM — O47.03 FALSE LABOR BEFORE 37 COMPLETED WEEKS OF GESTATION, THIRD TRIMESTER: Status: ACTIVE | Noted: 2022-04-17

## 2022-05-04 LAB
CHLAMYDIA BY NAA: NEGATIVE
NEISSERIA GONORRHOEAE, NAA: NEGATIVE
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
## 2022-05-30 PROBLEM — Z34.90 TERM PREGNANCY: Status: ACTIVE | Noted: 2022-05-30

## 2022-06-01 PROBLEM — Z34.90 TERM PREGNANCY: Status: RESOLVED | Noted: 2022-05-30 | Resolved: 2022-06-01

## 2022-06-17 ENCOUNTER — PATIENT MESSAGE (OUTPATIENT)
Dept: ADMINISTRATIVE | Facility: HOSPITAL | Age: 22
End: 2022-06-17
Payer: MEDICAID

## 2022-06-23 ENCOUNTER — PATIENT OUTREACH (OUTPATIENT)
Dept: ADMINISTRATIVE | Facility: HOSPITAL | Age: 22
End: 2022-06-23
Payer: MEDICAID

## 2022-07-20 ENCOUNTER — PATIENT OUTREACH (OUTPATIENT)
Dept: ADMINISTRATIVE | Facility: HOSPITAL | Age: 22
End: 2022-07-20
Payer: MEDICAID

## 2022-08-02 LAB
CHOLESTEROL, TOTAL: 202 MG/DL
HDLC SERPL-MCNC: 48 MG/DL
LDLC SERPL CALC-MCNC: 131 MG/DL (ref 0–160)
PAP RECOMMENDATION EXT: NORMAL
TRIGL SERPL-MCNC: 129 MG/DL
VLDL CHOLESTEROL: 23 MG/DL

## 2022-08-23 PROBLEM — M25.572 LEFT ANKLE PAIN: Status: ACTIVE | Noted: 2022-08-23

## 2022-08-23 PROBLEM — S93.402A SPRAIN OF LEFT ANKLE: Status: ACTIVE | Noted: 2022-08-23

## 2022-08-26 ENCOUNTER — PATIENT OUTREACH (OUTPATIENT)
Dept: ADMINISTRATIVE | Facility: HOSPITAL | Age: 22
End: 2022-08-26
Payer: MEDICAID

## 2023-02-17 ENCOUNTER — PATIENT OUTREACH (OUTPATIENT)
Dept: ADMINISTRATIVE | Facility: HOSPITAL | Age: 23
End: 2023-02-17
Payer: MEDICAID

## 2023-06-13 ENCOUNTER — PATIENT MESSAGE (OUTPATIENT)
Dept: RESEARCH | Facility: HOSPITAL | Age: 23
End: 2023-06-13
Payer: MEDICAID

## 2023-06-20 ENCOUNTER — PATIENT MESSAGE (OUTPATIENT)
Dept: RESEARCH | Facility: HOSPITAL | Age: 23
End: 2023-06-20
Payer: MEDICAID

## 2023-07-05 ENCOUNTER — PATIENT MESSAGE (OUTPATIENT)
Dept: RESEARCH | Facility: HOSPITAL | Age: 23
End: 2023-07-05
Payer: MEDICAID

## 2023-07-24 ENCOUNTER — PATIENT MESSAGE (OUTPATIENT)
Dept: RESEARCH | Facility: HOSPITAL | Age: 23
End: 2023-07-24
Payer: MEDICAID

## 2023-09-22 LAB
C TRACH RRNA SPEC QL NAA+PROBE: NEGATIVE
NEISSERIA GONORRHOEAE, NAA: NEGATIVE
PAP RECOMMENDATION EXT: NORMAL

## 2024-01-29 ENCOUNTER — PATIENT OUTREACH (OUTPATIENT)
Dept: ADMINISTRATIVE | Facility: HOSPITAL | Age: 24
End: 2024-01-29
Payer: MEDICAID